# Patient Record
Sex: FEMALE | NOT HISPANIC OR LATINO | URBAN - METROPOLITAN AREA
[De-identification: names, ages, dates, MRNs, and addresses within clinical notes are randomized per-mention and may not be internally consistent; named-entity substitution may affect disease eponyms.]

---

## 2017-12-06 ENCOUNTER — IMPORTED ENCOUNTER (OUTPATIENT)
Dept: URBAN - METROPOLITAN AREA CLINIC 1 | Facility: CLINIC | Age: 57
End: 2017-12-06

## 2017-12-06 PROBLEM — H52.4: Noted: 2017-12-06

## 2017-12-06 PROCEDURE — S0621 ROUTINE OPHTHALMOLOGICAL EXA: HCPCS

## 2017-12-06 NOTE — PATIENT DISCUSSION
1.  Presbyopia: Rx was given for corrective spectacles if indicated. 2.  Cataracts OU3. Return for an appointment in 1 year for 40. with Dr. Precious Teran.

## 2018-12-07 ENCOUNTER — IMPORTED ENCOUNTER (OUTPATIENT)
Dept: URBAN - METROPOLITAN AREA CLINIC 1 | Facility: CLINIC | Age: 58
End: 2018-12-07

## 2018-12-07 PROBLEM — H52.223: Noted: 2018-12-07

## 2018-12-07 PROBLEM — H52.4: Noted: 2018-12-07

## 2018-12-07 PROBLEM — H02.831: Noted: 2018-12-07

## 2018-12-07 PROBLEM — Z01.00: Noted: 2018-12-07

## 2018-12-07 PROCEDURE — S0621 ROUTINE OPHTHALMOLOGICAL EXA: HCPCS

## 2018-12-07 NOTE — PATIENT DISCUSSION
1.  Routine Exam OU -- Patient has minimal refractive error OU. All conditions discussed with patient today2. Presbyopia OU -- Finalized Glasses MRx was given to patient today for corrective spectacles if indicated3. Astigmatism OU 4. Cataracts OU -- Observe 5. Dry Eyes OU -- Recommend the frequent use of OTC AT's BID-QID OU PRN6. Dermatochalasis RUL -- Follow with no intervention at this timeReturn for an appointment in 1 YR for a 40 OU with Dr. Silva Banuelos.

## 2019-12-17 ENCOUNTER — IMPORTED ENCOUNTER (OUTPATIENT)
Dept: URBAN - METROPOLITAN AREA CLINIC 1 | Facility: CLINIC | Age: 59
End: 2019-12-17

## 2019-12-17 PROBLEM — H52.4: Noted: 2019-12-17

## 2019-12-17 PROBLEM — H52.03: Noted: 2019-12-17

## 2019-12-17 PROCEDURE — S0621 ROUTINE OPHTHALMOLOGICAL EXA: HCPCS

## 2019-12-17 NOTE — PATIENT DISCUSSION
1.  Hyperopia / Presbyopia OU -- Finalized Glasses MRx was given to patient today for corrective spectacles if indicated. 2.  Cataracts OU -- Observe. 3. Dry Eyes OU -- Recommend the frequent use of OTC AT's BID-QID OU Routinely. 4. Dermatochalasis RUL -- Follow with no intervention at this time. Return for an appointment in 1 YR for a 36 OU with Dr. Rebeka Scales.

## 2021-01-12 ENCOUNTER — IMPORTED ENCOUNTER (OUTPATIENT)
Dept: URBAN - METROPOLITAN AREA CLINIC 1 | Facility: CLINIC | Age: 61
End: 2021-01-12

## 2021-01-12 PROBLEM — H52.223: Noted: 2021-01-12

## 2021-01-12 PROBLEM — H52.03: Noted: 2021-01-12

## 2021-01-12 PROBLEM — H52.4: Noted: 2021-01-12

## 2021-01-12 PROCEDURE — S0621 ROUTINE OPHTHALMOLOGICAL EXA: HCPCS

## 2021-01-12 NOTE — PATIENT DISCUSSION
1.  Hyperopia- Rx was given for corrective spectacles if indicated. 2.  Astigmatism OU3. Presbyopia4. Cataracts OU- Observe5. Dry Eyes OU- Recommend the frequent use of OTC AT's BID-QID OU Routinely. 6. Dermatochalasis RUL- Follow with no intervention at this time. 7. Family hx of ARMD (Aunt and Uncle on fathers side)Return for an appointment in 1 yr 36 with Dr. Precious Teran. Return for an appointment in 3 mo 30 with Dr. Precious Teran.

## 2022-01-13 ENCOUNTER — IMPORTED ENCOUNTER (OUTPATIENT)
Dept: URBAN - METROPOLITAN AREA CLINIC 1 | Facility: CLINIC | Age: 62
End: 2022-01-13

## 2022-01-13 PROBLEM — H43.811: Noted: 2022-01-13

## 2022-01-13 PROBLEM — H04.123: Noted: 2022-01-13

## 2022-01-13 PROBLEM — H02.834: Noted: 2022-01-13

## 2022-01-13 PROBLEM — H02.831: Noted: 2022-01-13

## 2022-01-13 PROBLEM — H25.813: Noted: 2022-01-13

## 2022-01-13 PROCEDURE — 92014 COMPRE OPH EXAM EST PT 1/>: CPT

## 2022-01-13 NOTE — PATIENT DISCUSSION
Return for an appointment in 6 months for 40 with Dr. Arely Louis. Return for an appointment in 1 year for 30 with Dr. Arely Louis.

## 2022-01-13 NOTE — PATIENT DISCUSSION
1.  Cataract OU - Observe for now without intervention. The patient was advised to contact us if any change or worsening of vision. 2. Dry Eyes OU - Recommend the use of ATs TID OU routinely. 3. Dermatochalasis RUL  - Follow with no intervention at this time. 4. PVD w/o Tear OD - Patient was cautioned to call our office immediately if they experience   a substantial change in their symptoms such as an increase in floaters persistent flashes loss of visual field (may appear as a shadow or a curtain) or decrease in visual acuity as these may indicate a retinal tear or detachment. 5.  Family hx of ARMD (Aunt and Uncle on fathers side)Return for an appointment in 6 months for 36 with Dr. Pablo Gibbs. Return for an appointment in 1 year for 30 with Dr. Pablo Gibbs.

## 2022-04-02 ASSESSMENT — TONOMETRY
OD_IOP_MMHG: 13
OS_IOP_MMHG: 14
OD_IOP_MMHG: 14
OS_IOP_MMHG: 13
OS_IOP_MMHG: 14
OD_IOP_MMHG: 13
OD_IOP_MMHG: 14
OS_IOP_MMHG: 13
OS_IOP_MMHG: 14
OD_IOP_MMHG: 13

## 2022-04-02 ASSESSMENT — VISUAL ACUITY
OD_CC: J1+
OS_CC: 20/20
OD_CC: 20/20-1
OD_CC: J1
OS_CC: 20/25
OS_CC: 20/20-1
OS_CC: 20/30+1
OS_CC: J1
OS_CC: J1+
OD_CC: 20/20
OD_CC: J1+
OD_CC: 20/30
OD_CC: J1
OD_CC: 20/25-1
OS_CC: J1+
OS_CC: 20/20
OS_CC: J1
OD_CC: 20/40-2

## 2022-11-09 ENCOUNTER — COMPREHENSIVE EXAM (OUTPATIENT)
Dept: URBAN - METROPOLITAN AREA CLINIC 1 | Facility: CLINIC | Age: 62
End: 2022-11-09

## 2022-11-09 DIAGNOSIS — H52.223: ICD-10-CM

## 2022-11-09 DIAGNOSIS — H52.03: ICD-10-CM

## 2022-11-09 DIAGNOSIS — H52.4: ICD-10-CM

## 2022-11-09 PROCEDURE — 92014 COMPRE OPH EXAM EST PT 1/>: CPT

## 2022-11-09 ASSESSMENT — VISUAL ACUITY
OS_SC: J7
OD_SC: J7
OD_SC: 20/30-2
OS_SC: 20/30-2

## 2022-11-09 ASSESSMENT — TONOMETRY
OD_IOP_MMHG: 13
OS_IOP_MMHG: 14

## 2024-01-30 ENCOUNTER — COMPREHENSIVE EXAM (OUTPATIENT)
Dept: URBAN - METROPOLITAN AREA CLINIC 1 | Facility: CLINIC | Age: 64
End: 2024-01-30

## 2024-01-30 DIAGNOSIS — H52.4: ICD-10-CM

## 2024-01-30 DIAGNOSIS — H52.03: ICD-10-CM

## 2024-01-30 DIAGNOSIS — Z01.00: ICD-10-CM

## 2024-01-30 DIAGNOSIS — H52.223: ICD-10-CM

## 2024-01-30 PROCEDURE — 92015 DETERMINE REFRACTIVE STATE: CPT

## 2024-01-30 PROCEDURE — 92014 COMPRE OPH EXAM EST PT 1/>: CPT

## 2024-01-30 ASSESSMENT — VISUAL ACUITY
OD_CC: 20/20-1
OS_CC: 20/20-1

## 2024-01-30 ASSESSMENT — TONOMETRY
OS_IOP_MMHG: 12
OD_IOP_MMHG: 11

## 2024-05-21 NOTE — PATIENT DISCUSSION
Lens Material: You can access the FollowMyHealth Patient Portal offered by [facility] by registering at the following website: [email or web address]. By joining [employer]’s FollowMyHealth portal, you will also be able to view your health information using other applications (apps) compatible with our system.

## 2025-02-11 ENCOUNTER — COMPREHENSIVE EXAM (OUTPATIENT)
Age: 65
End: 2025-02-11

## 2025-02-11 DIAGNOSIS — H52.223: ICD-10-CM

## 2025-02-11 DIAGNOSIS — Z01.00: ICD-10-CM

## 2025-02-11 DIAGNOSIS — H52.4: ICD-10-CM

## 2025-02-11 DIAGNOSIS — H52.03: ICD-10-CM

## 2025-02-11 PROCEDURE — 92015 DETERMINE REFRACTIVE STATE: CPT

## 2025-02-11 PROCEDURE — 92014 COMPRE OPH EXAM EST PT 1/>: CPT

## 2025-08-12 ENCOUNTER — COMPREHENSIVE EXAM (OUTPATIENT)
Age: 65
End: 2025-08-12

## 2025-08-12 DIAGNOSIS — H40.033: ICD-10-CM

## 2025-08-12 DIAGNOSIS — H43.811: ICD-10-CM

## 2025-08-12 DIAGNOSIS — H25.813: ICD-10-CM

## 2025-08-12 DIAGNOSIS — H52.03: ICD-10-CM

## 2025-08-12 DIAGNOSIS — H02.834: ICD-10-CM

## 2025-08-12 DIAGNOSIS — H04.123: ICD-10-CM

## 2025-08-12 DIAGNOSIS — H02.831: ICD-10-CM

## 2025-08-12 PROCEDURE — 99214 OFFICE O/P EST MOD 30 MIN: CPT

## 2025-08-12 PROCEDURE — 92015 DETERMINE REFRACTIVE STATE: CPT
